# Patient Record
Sex: FEMALE | Race: WHITE | NOT HISPANIC OR LATINO | Employment: OTHER | URBAN - METROPOLITAN AREA
[De-identification: names, ages, dates, MRNs, and addresses within clinical notes are randomized per-mention and may not be internally consistent; named-entity substitution may affect disease eponyms.]

---

## 2024-03-01 ENCOUNTER — NURSING HOME VISIT (OUTPATIENT)
Dept: POST ACUTE CARE | Facility: EXTERNAL LOCATION | Age: 89
End: 2024-03-01
Payer: MEDICARE

## 2024-03-01 VITALS
BODY MASS INDEX: 21.34 KG/M2 | OXYGEN SATURATION: 97 % | HEART RATE: 61 BPM | WEIGHT: 125 LBS | TEMPERATURE: 97.3 F | HEIGHT: 64 IN | DIASTOLIC BLOOD PRESSURE: 64 MMHG | RESPIRATION RATE: 19 BRPM | SYSTOLIC BLOOD PRESSURE: 138 MMHG

## 2024-03-01 DIAGNOSIS — I50.89 OTHER HEART FAILURE (MULTI): ICD-10-CM

## 2024-03-01 DIAGNOSIS — E55.9 VITAMIN D DEFICIENCY: ICD-10-CM

## 2024-03-01 DIAGNOSIS — I48.0 PAROXYSMAL ATRIAL FIBRILLATION (MULTI): Primary | ICD-10-CM

## 2024-03-01 DIAGNOSIS — N39.41 URGE INCONTINENCE OF URINE: ICD-10-CM

## 2024-03-01 DIAGNOSIS — M15.9 PRIMARY OSTEOARTHRITIS INVOLVING MULTIPLE JOINTS: ICD-10-CM

## 2024-03-01 PROBLEM — K21.9 GASTROESOPHAGEAL REFLUX DISEASE WITHOUT ESOPHAGITIS: Status: ACTIVE | Noted: 2024-03-01

## 2024-03-01 PROBLEM — M19.90 DEGENERATIVE JOINT DISEASE: Status: ACTIVE | Noted: 2024-03-01

## 2024-03-01 PROBLEM — F01.50 VASCULAR DEMENTIA (MULTI): Status: ACTIVE | Noted: 2024-03-01

## 2024-03-01 PROCEDURE — 99344 HOME/RES VST NEW MOD MDM 60: CPT

## 2024-03-01 PROCEDURE — 99497 ADVNCD CARE PLAN 30 MIN: CPT

## 2024-03-01 RX ORDER — POTASSIUM CHLORIDE 750 MG/1
10 TABLET, FILM COATED, EXTENDED RELEASE ORAL DAILY
COMMUNITY

## 2024-03-01 RX ORDER — B-COMPLEX WITH VITAMIN C
1 TABLET ORAL DAILY
COMMUNITY

## 2024-03-01 RX ORDER — CALCIUM CARBONATE 200(500)MG
2 TABLET,CHEWABLE ORAL 3 TIMES DAILY PRN
COMMUNITY

## 2024-03-01 RX ORDER — CYANOCOBALAMIN (VITAMIN B-12) 500 MCG
2 TABLET ORAL DAILY
COMMUNITY

## 2024-03-01 RX ORDER — PANTOPRAZOLE SODIUM 40 MG/1
40 TABLET, DELAYED RELEASE ORAL
COMMUNITY

## 2024-03-01 RX ORDER — SERTRALINE HYDROCHLORIDE 50 MG/1
50 TABLET, FILM COATED ORAL DAILY
COMMUNITY

## 2024-03-01 RX ORDER — LOPERAMIDE HYDROCHLORIDE 2 MG/1
2 CAPSULE ORAL 3 TIMES DAILY PRN
COMMUNITY

## 2024-03-01 RX ORDER — ACETAMINOPHEN 325 MG/1
650 TABLET ORAL 3 TIMES DAILY PRN
COMMUNITY

## 2024-03-01 RX ORDER — ACETAMINOPHEN 500 MG
1000 TABLET ORAL DAILY
COMMUNITY

## 2024-03-01 RX ORDER — METOPROLOL TARTRATE 25 MG/1
25 TABLET, FILM COATED ORAL 2 TIMES DAILY
COMMUNITY

## 2024-03-01 RX ORDER — FUROSEMIDE 20 MG/1
20 TABLET ORAL DAILY
COMMUNITY

## 2024-03-01 ASSESSMENT — ENCOUNTER SYMPTOMS
ABDOMINAL DISTENTION: 0
DIFFICULTY URINATING: 0
DIZZINESS: 0
CHOKING: 0
CHEST TIGHTNESS: 0
APPETITE CHANGE: 0
CONSTIPATION: 0
ABDOMINAL PAIN: 0
HEADACHES: 0
HEMATURIA: 0
ARTHRALGIAS: 1
ACTIVITY CHANGE: 0
EYE DISCHARGE: 0
CHILLS: 0
VOMITING: 0
FATIGUE: 0
EYE PAIN: 0
SHORTNESS OF BREATH: 0
WEAKNESS: 0
RHINORRHEA: 1
DIARRHEA: 0
SLEEP DISTURBANCE: 0
SORE THROAT: 0
SEIZURES: 0
BRUISES/BLEEDS EASILY: 0
DYSURIA: 0
WHEEZING: 0
NAUSEA: 0
NERVOUS/ANXIOUS: 0
EYE ITCHING: 0
FEVER: 0
SPEECH DIFFICULTY: 0
COUGH: 0
PALPITATIONS: 0
LIGHT-HEADEDNESS: 0
FLANK PAIN: 0
CONFUSION: 1

## 2024-03-01 ASSESSMENT — PAIN SCALES - GENERAL: PAINLEVEL: 0-NO PAIN

## 2024-03-01 NOTE — ASSESSMENT & PLAN NOTE
History of vascular dementia. Mood is stable on sertraline. Staff to report any behavioral concerns to provider.

## 2024-03-01 NOTE — ASSESSMENT & PLAN NOTE
History of. Last echo in 2022 with EF 65%. Rate controlled.   Continue with metoprolol, furosemide, and potassium supplement. Facility to monitor VS and weights routinely. Staff to notify provider with any cardiovascular concerns.

## 2024-03-01 NOTE — PROGRESS NOTES
"Subjective   Patient ID: Guera Leon is a 88 y.o. female who presents for Follow-up (Afib, vascular dementia).    HPI   Patient is seen sitting in chair in her room, this visit is to establish care with the [facility] program. Patient's daughter Rae is present for visit. Explained House Calls program and expectations.  Rae assists with posing questions to her mom and providing much of the information, as the patient has dementia and is a poor historian.     PMH: paroxysmal atrial fibrillation, CHF, GERD, urge incontinence, degenerative joint disease, vascular dementia, hard of hearing.     Rae has no siblings and was providing care for her mother prior to moving her to the assisted living facility. States her mother lived in her own home by herself until November 2023. Previous PCP was Dr. Walter Madrigal at [facility].   Daughter reports that she feels her mother is happy here, there is no anxiety or abnormal behavior. The patient eats all meals in her room and does not join in any group activities. She is independent with ambulation, no walker/cane, no falls reported. The patient denies feeling unwell, no fevers, chills, headache, congestion. Denies nausea, daughter states that \"she eats everything\". The patient has urge incontinence but will go to the bathroom if reminded. No issues with bowel or bladder. She is sleeping well, at least 12-14 hours each night.       Current Outpatient Medications:     acetaminophen (Tylenol) 325 mg tablet, Take 2 tablets (650 mg) by mouth 3 times a day as needed for mild pain (1 - 3) (for pain)., Disp: , Rfl:     acetaminophen (Tylenol) 500 mg tablet, Take 2 tablets (1,000 mg) by mouth once daily., Disp: , Rfl:     B complex-vitamin C tablet, Take 1 tablet by mouth once daily., Disp: , Rfl:     calcium carbonate (Tums) 200 mg calcium chewable tablet, Chew 2 tablets (1,000 mg) 3 times a day as needed for indigestion or heartburn., Disp: , Rfl:     furosemide (Lasix) 20 mg tablet, " "Take 1 tablet (20 mg) by mouth once daily., Disp: , Rfl:     loperamide (Imodium) 2 mg capsule, Take 1 capsule (2 mg) by mouth 3 times a day as needed for diarrhea (for loose stools)., Disp: , Rfl:     metoprolol tartrate (Lopressor) 25 mg tablet, Take 1 tablet (25 mg) by mouth 2 times a day., Disp: , Rfl:     pantoprazole (ProtoNix) 40 mg EC tablet, Take 1 tablet (40 mg) by mouth once daily in the morning. Take before meals. Do not crush, chew, or split., Disp: , Rfl:     potassium chloride CR 10 mEq ER tablet, Take 1 tablet (10 mEq) by mouth once daily. Do not crush, chew, or split., Disp: , Rfl:     sertraline (Zoloft) 50 mg tablet, Take 1 tablet (50 mg) by mouth once daily. At night, Disp: , Rfl:      Review of Systems   Constitutional:  Negative for activity change, appetite change, chills, fatigue and fever.   HENT:  Positive for rhinorrhea. Negative for congestion, drooling and sore throat.    Eyes:  Negative for pain, discharge and itching.   Respiratory:  Negative for cough, choking, chest tightness, shortness of breath and wheezing.    Cardiovascular:  Negative for chest pain, palpitations and leg swelling.   Gastrointestinal:  Negative for abdominal distention, abdominal pain, constipation, diarrhea, nausea and vomiting.   Genitourinary:  Negative for difficulty urinating, dysuria, flank pain and hematuria.   Musculoskeletal:  Positive for arthralgias.   Neurological:  Negative for dizziness, seizures, syncope, speech difficulty, weakness, light-headedness and headaches.   Hematological:  Does not bruise/bleed easily.   Psychiatric/Behavioral:  Positive for confusion. Negative for behavioral problems and sleep disturbance. The patient is not nervous/anxious.        Objective   /64 (BP Location: Left arm, Patient Position: Sitting, BP Cuff Size: Adult)   Pulse 61   Temp 36.3 °C (97.3 °F) (Temporal)   Resp 19   Ht 1.626 m (5' 4\")   Wt 56.7 kg (125 lb)   SpO2 97% Comment: room air  BMI 21.46 " "kg/m²     Physical Exam  Constitutional:       General: She is not in acute distress.     Appearance: Normal appearance. She is normal weight. She is not ill-appearing.   HENT:      Head: Normocephalic.      Nose: No congestion or rhinorrhea.      Mouth/Throat:      Mouth: Mucous membranes are moist.   Eyes:      Pupils: Pupils are equal, round, and reactive to light.   Cardiovascular:      Rate and Rhythm: Normal rate and regular rhythm.      Pulses: Normal pulses.      Heart sounds: No murmur heard.  Pulmonary:      Effort: Pulmonary effort is normal. No respiratory distress.      Breath sounds: Normal breath sounds. No wheezing or rales.   Abdominal:      General: Bowel sounds are normal. There is no distension.      Palpations: Abdomen is soft.      Tenderness: There is no abdominal tenderness. There is no guarding.   Musculoskeletal:      Cervical back: Neck supple.      Right lower leg: No edema.      Left lower leg: No edema.   Skin:     General: Skin is warm and dry.      Capillary Refill: Capillary refill takes less than 2 seconds.      Findings: Lesion present. No rash.      Comments: Small, round open lesion noted on left ear, middle, outer edge of tragus   Neurological:      Mental Status: She is alert. Mental status is at baseline.   Psychiatric:         Mood and Affect: Mood normal.         Behavior: Behavior normal.      Comments: Daughter states patient seems quite happy, has not noticed any crying or abnormal behavior.          Assessment/Plan   Diagnoses and all orders for this visit:  Paroxysmal atrial fibrillation (CMS/HCC)  Vitamin D deficiency  Urge incontinence of urine  Primary osteoarthritis involving multiple joints  Other heart failure (CMS/HCC)    Goals of care discussion with daughter Rae. Rae is POA for the patient and states that she believes that \"if something should happen to my mom, it is her time to go\". Rae does not wish her mother to receive chest compressions or intubation. She " thinks that she has completed paperwork already, but it is not signed. Will follow up with the Director of Nursing.   All questions and concerns that Rae had were addressed. Patient is stable, continue with monthly House Calls NP visits.       Erika Matta, APRN-CNP

## 2024-03-01 NOTE — ASSESSMENT & PLAN NOTE
Chronic. Reports no pain this visit. Independent with ambulation, no recent falls. Continue scheduled acetaminophen for pain control. Staff to report uncontrolled pain levels to provider.

## 2024-03-01 NOTE — LETTER
"Patient: Guera Leon  : 1935    Encounter Date: 2024    Subjective  Patient ID: Guera Leon is a 88 y.o. female who presents for Follow-up (Afib, vascular dementia).    HPI   Patient is seen sitting in chair in her room, this visit is to establish care with the House Calls program. Patient's daughter Rae is present for visit. Explained House Calls program and expectations.  Rae assists with posing questions to her mom and providing much of the information, as the patient has dementia and is a poor historian.     PMH: paroxysmal atrial fibrillation, CHF, GERD, urge incontinence, degenerative joint disease, vascular dementia, hard of hearing.     Rae has no siblings and was providing care for her mother prior to moving her to the assisted living facility. States her mother lived in her own home by herself until 2023. Previous PCP was Dr. Walter Madrigal at [facility].   Daughter reports that she feels her mother is happy here, there is no anxiety or abnormal behavior. The patient eats all meals in her room and does not join in any group activities. She is independent with ambulation, no walker/cane, no falls reported. The patient denies feeling unwell, no fevers, chills, headache, congestion. Denies nausea, daughter states that \"she eats everything\". The patient has urge incontinence but will go to the bathroom if reminded. No issues with bowel or bladder. She is sleeping well, at least 12-14 hours each night.       Current Outpatient Medications:   •  acetaminophen (Tylenol) 325 mg tablet, Take 2 tablets (650 mg) by mouth 3 times a day as needed for mild pain (1 - 3) (for pain)., Disp: , Rfl:   •  acetaminophen (Tylenol) 500 mg tablet, Take 2 tablets (1,000 mg) by mouth once daily., Disp: , Rfl:   •  B complex-vitamin C tablet, Take 1 tablet by mouth once daily., Disp: , Rfl:   •  calcium carbonate (Tums) 200 mg calcium chewable tablet, Chew 2 tablets (1,000 mg) 3 times a day as needed " for indigestion or heartburn., Disp: , Rfl:   •  furosemide (Lasix) 20 mg tablet, Take 1 tablet (20 mg) by mouth once daily., Disp: , Rfl:   •  loperamide (Imodium) 2 mg capsule, Take 1 capsule (2 mg) by mouth 3 times a day as needed for diarrhea (for loose stools)., Disp: , Rfl:   •  metoprolol tartrate (Lopressor) 25 mg tablet, Take 1 tablet (25 mg) by mouth 2 times a day., Disp: , Rfl:   •  pantoprazole (ProtoNix) 40 mg EC tablet, Take 1 tablet (40 mg) by mouth once daily in the morning. Take before meals. Do not crush, chew, or split., Disp: , Rfl:   •  potassium chloride CR 10 mEq ER tablet, Take 1 tablet (10 mEq) by mouth once daily. Do not crush, chew, or split., Disp: , Rfl:   •  sertraline (Zoloft) 50 mg tablet, Take 1 tablet (50 mg) by mouth once daily. At night, Disp: , Rfl:      Review of Systems   Constitutional:  Negative for activity change, appetite change, chills, fatigue and fever.   HENT:  Positive for rhinorrhea. Negative for congestion, drooling and sore throat.    Eyes:  Negative for pain, discharge and itching.   Respiratory:  Negative for cough, choking, chest tightness, shortness of breath and wheezing.    Cardiovascular:  Negative for chest pain, palpitations and leg swelling.   Gastrointestinal:  Negative for abdominal distention, abdominal pain, constipation, diarrhea, nausea and vomiting.   Genitourinary:  Negative for difficulty urinating, dysuria, flank pain and hematuria.   Musculoskeletal:  Positive for arthralgias.   Neurological:  Negative for dizziness, seizures, syncope, speech difficulty, weakness, light-headedness and headaches.   Hematological:  Does not bruise/bleed easily.   Psychiatric/Behavioral:  Positive for confusion. Negative for behavioral problems and sleep disturbance. The patient is not nervous/anxious.        Objective  /64 (BP Location: Left arm, Patient Position: Sitting, BP Cuff Size: Adult)   Pulse 61   Temp 36.3 °C (97.3 °F) (Temporal)   Resp 19    "Ht 1.626 m (5' 4\")   Wt 56.7 kg (125 lb)   SpO2 97% Comment: room air  BMI 21.46 kg/m²     Physical Exam  Constitutional:       General: She is not in acute distress.     Appearance: Normal appearance. She is normal weight. She is not ill-appearing.   HENT:      Head: Normocephalic.      Nose: No congestion or rhinorrhea.      Mouth/Throat:      Mouth: Mucous membranes are moist.   Eyes:      Pupils: Pupils are equal, round, and reactive to light.   Cardiovascular:      Rate and Rhythm: Normal rate and regular rhythm.      Pulses: Normal pulses.      Heart sounds: No murmur heard.  Pulmonary:      Effort: Pulmonary effort is normal. No respiratory distress.      Breath sounds: Normal breath sounds. No wheezing or rales.   Abdominal:      General: Bowel sounds are normal. There is no distension.      Palpations: Abdomen is soft.      Tenderness: There is no abdominal tenderness. There is no guarding.   Musculoskeletal:      Cervical back: Neck supple.      Right lower leg: No edema.      Left lower leg: No edema.   Skin:     General: Skin is warm and dry.      Capillary Refill: Capillary refill takes less than 2 seconds.      Findings: Lesion present. No rash.      Comments: Small, round open lesion noted on left ear, middle, outer edge of tragus   Neurological:      Mental Status: She is alert. Mental status is at baseline.   Psychiatric:         Mood and Affect: Mood normal.         Behavior: Behavior normal.      Comments: Daughter states patient seems quite happy, has not noticed any crying or abnormal behavior.          Assessment/Plan  Diagnoses and all orders for this visit:  Paroxysmal atrial fibrillation (CMS/HCC)  Vitamin D deficiency  Urge incontinence of urine  Primary osteoarthritis involving multiple joints  Other heart failure (CMS/HCC)    Goals of care discussion with daughter Rae. Rae is POA for the patient and states that she believes that \"if something should happen to my mom, it is her time to " "go\". Rae does not wish her mother to receive chest compressions or intubation. She thinks that she has completed paperwork already, but it is not signed. Will follow up with the Director of Nursing.   All questions and concerns that Rae had were addressed. Patient is stable, continue with monthly House Calls NP visits.       EDITH Wagner      Electronically Signed By: EDITH Wagner   3/1/24  1:21 PM  "

## 2024-05-03 ENCOUNTER — NURSING HOME VISIT (OUTPATIENT)
Dept: POST ACUTE CARE | Facility: EXTERNAL LOCATION | Age: 89
End: 2024-05-03
Payer: MEDICARE

## 2024-05-03 VITALS
SYSTOLIC BLOOD PRESSURE: 140 MMHG | RESPIRATION RATE: 16 BRPM | TEMPERATURE: 97.5 F | HEART RATE: 60 BPM | DIASTOLIC BLOOD PRESSURE: 80 MMHG | OXYGEN SATURATION: 95 %

## 2024-05-03 DIAGNOSIS — K21.9 GASTROESOPHAGEAL REFLUX DISEASE WITHOUT ESOPHAGITIS: ICD-10-CM

## 2024-05-03 DIAGNOSIS — I48.0 PAROXYSMAL ATRIAL FIBRILLATION (MULTI): Primary | ICD-10-CM

## 2024-05-03 DIAGNOSIS — F01.50 VASCULAR DEMENTIA, UNSPECIFIED DEMENTIA SEVERITY, UNSPECIFIED WHETHER BEHAVIORAL, PSYCHOTIC, OR MOOD DISTURBANCE OR ANXIETY (MULTI): ICD-10-CM

## 2024-05-03 PROCEDURE — 99349 HOME/RES VST EST MOD MDM 40: CPT

## 2024-05-03 ASSESSMENT — PAIN SCALES - GENERAL: PAINLEVEL: 0-NO PAIN

## 2024-05-03 NOTE — Clinical Note
Patient: Guera Leon  : 1935    Encounter Date: 2024    No notes on file    Electronically Signed By: EDITH Wagner   24 12:28 PM

## 2024-05-10 ASSESSMENT — ENCOUNTER SYMPTOMS
HEADACHES: 0
EYE PAIN: 0
RHINORRHEA: 1
SPEECH DIFFICULTY: 0
SLEEP DISTURBANCE: 0
LIGHT-HEADEDNESS: 0
BRUISES/BLEEDS EASILY: 0
CHOKING: 0
SEIZURES: 0
ARTHRALGIAS: 1
ACTIVITY CHANGE: 0
SORE THROAT: 0
FREQUENCY: 0
FATIGUE: 0
FLANK PAIN: 0
DIAPHORESIS: 0
APPETITE CHANGE: 0
WOUND: 1
CHILLS: 0
HEMATURIA: 0
VOMITING: 0
DIFFICULTY URINATING: 0
FEVER: 0
AGITATION: 0
NAUSEA: 0
CONFUSION: 1
ABDOMINAL PAIN: 0
DIZZINESS: 0
NERVOUS/ANXIOUS: 0
DYSURIA: 0
PALPITATIONS: 0
UNEXPECTED WEIGHT CHANGE: 0
CONSTIPATION: 0
TREMORS: 0
COUGH: 0
DIARRHEA: 0
SHORTNESS OF BREATH: 0

## 2024-05-10 NOTE — PROGRESS NOTES
Subjective   Patient ID: Guera Leon is a 88 y.o. female who presents for Follow-up.    HPI   Patient is seen in her room in no acute distress. Nursing reports that she does not leave her room. HPI and ROS supplemented by nursing as patient is extremely hard of hearing with a history of dementia, and is a poor historian. The patient eats all meals in her room and does not join in any group activities. She is independent with ambulation, no walker/cane, no falls reported. The patient denies feeling unwell, no fevers, chills, headache, congestion. Denies nausea, nursing reports no issues with appetite. The patient has urge incontinence but will go to the bathroom if reminded. No issues with bowel or bladder. She is sleeping well, at least 12-14 hours each night.     PMH: paroxysmal atrial fibrillation, CHF, GERD, urge incontinence, degenerative joint disease, vascular dementia, hard of hearing.     Current Outpatient Medications:     acetaminophen (Tylenol) 325 mg tablet, Take 2 tablets (650 mg) by mouth 3 times a day as needed for mild pain (1 - 3) (for pain)., Disp: , Rfl:     acetaminophen (Tylenol) 500 mg tablet, Take 2 tablets (1,000 mg) by mouth once daily., Disp: , Rfl:     B complex-vitamin C tablet, Take 1 tablet by mouth once daily., Disp: , Rfl:     calcium carbonate (Tums) 200 mg calcium chewable tablet, Chew 2 tablets (1,000 mg) 3 times a day as needed for indigestion or heartburn., Disp: , Rfl:     cholecalciferol (Vitamin D-3) 10 MCG (400 UNIT) tablet, Take 2 tablets (20 mcg) by mouth once daily., Disp: , Rfl:     furosemide (Lasix) 20 mg tablet, Take 1 tablet (20 mg) by mouth once daily., Disp: , Rfl:     loperamide (Imodium) 2 mg capsule, Take 1 capsule (2 mg) by mouth 3 times a day as needed for diarrhea (for loose stools)., Disp: , Rfl:     metoprolol tartrate (Lopressor) 25 mg tablet, Take 1 tablet (25 mg) by mouth 2 times a day., Disp: , Rfl:     pantoprazole (ProtoNix) 40 mg EC tablet,  Take 1 tablet (40 mg) by mouth once daily in the morning. Take before meals. Do not crush, chew, or split., Disp: , Rfl:     potassium chloride CR 10 mEq ER tablet, Take 1 tablet (10 mEq) by mouth once daily. Do not crush, chew, or split., Disp: , Rfl:     sertraline (Zoloft) 50 mg tablet, Take 1 tablet (50 mg) by mouth once daily. At night, Disp: , Rfl:      Review of Systems   Constitutional:  Negative for activity change, appetite change, chills, diaphoresis, fatigue, fever and unexpected weight change.   HENT:  Positive for hearing loss and rhinorrhea. Negative for congestion, dental problem, ear pain and sore throat.    Eyes:  Negative for pain and visual disturbance.   Respiratory:  Negative for cough, choking and shortness of breath.    Cardiovascular:  Negative for chest pain, palpitations and leg swelling.   Gastrointestinal:  Negative for abdominal pain, constipation, diarrhea, nausea and vomiting.   Genitourinary:  Negative for difficulty urinating, dysuria, flank pain, frequency and hematuria.   Musculoskeletal:  Positive for arthralgias. Negative for gait problem.   Skin:  Positive for wound. Negative for rash.   Neurological:  Negative for dizziness, tremors, seizures, syncope, speech difficulty, light-headedness and headaches.   Hematological:  Does not bruise/bleed easily.   Psychiatric/Behavioral:  Positive for confusion. Negative for agitation, behavioral problems and sleep disturbance. The patient is not nervous/anxious.        Objective   /80 (BP Location: Right arm, Patient Position: Sitting, BP Cuff Size: Adult)   Pulse 60   Temp 36.4 °C (97.5 °F) (Temporal)   Resp 16   SpO2 95%     Physical Exam  Constitutional:       General: She is not in acute distress.     Appearance: Normal appearance. She is not ill-appearing.   HENT:      Head: Normocephalic.      Right Ear: External ear normal.      Left Ear: External ear normal.      Nose: Rhinorrhea present. No congestion.       Mouth/Throat:      Mouth: Mucous membranes are moist.   Eyes:      Conjunctiva/sclera: Conjunctivae normal.      Pupils: Pupils are equal, round, and reactive to light.   Cardiovascular:      Rate and Rhythm: Normal rate and regular rhythm.      Pulses: Normal pulses.      Heart sounds: No murmur heard.  Pulmonary:      Effort: Pulmonary effort is normal. No respiratory distress.      Breath sounds: No wheezing or rales.   Abdominal:      General: Bowel sounds are normal. There is no distension.      Palpations: Abdomen is soft.      Tenderness: There is no abdominal tenderness. There is no guarding.   Musculoskeletal:      Cervical back: Neck supple.      Right lower leg: No edema.      Left lower leg: No edema.   Skin:     General: Skin is warm and dry.      Capillary Refill: Capillary refill takes less than 2 seconds.      Findings: Lesion present. No rash.      Comments: Small, round open lesion noted on left ear, middle, outer edge of tragus    Neurological:      Mental Status: She is alert. Mental status is at baseline.      Comments: Oriented to self only.   Psychiatric:         Mood and Affect: Mood normal.         Behavior: Behavior normal.         Assessment/Plan   Paroxysmal atrial fibrillation (Multi)  History of. Last echo in 2022 with EF 65%. Rate controlled.   -continue with metoprolol, furosemide, and potassium supplement.   -facility to monitor VS and weights routinely. Staff to notify provider with any cardiovascular concerns.    Gastroesophageal reflux disease without esophagitis  Chronic, stable.  -continue PPI  Vascular dementia, unspecified dementia severity, unspecified whether behavioral, psychotic, or mood disturbance or anxiety (Multi)  History of vascular dementia. Mood is stable on sertraline.   -continue sertraline  -staff to report any behavioral concerns to provider.        Erika Matta, APRSUMMER-CNP

## 2024-06-01 ENCOUNTER — HOSPITAL ENCOUNTER (EMERGENCY)
Facility: HOSPITAL | Age: 89
Discharge: HOME | End: 2024-06-01
Attending: STUDENT IN AN ORGANIZED HEALTH CARE EDUCATION/TRAINING PROGRAM
Payer: MEDICARE

## 2024-06-01 ENCOUNTER — APPOINTMENT (OUTPATIENT)
Dept: RADIOLOGY | Facility: HOSPITAL | Age: 89
End: 2024-06-01
Payer: MEDICARE

## 2024-06-01 VITALS
DIASTOLIC BLOOD PRESSURE: 75 MMHG | RESPIRATION RATE: 20 BRPM | HEART RATE: 79 BPM | OXYGEN SATURATION: 98 % | BODY MASS INDEX: 21.34 KG/M2 | SYSTOLIC BLOOD PRESSURE: 139 MMHG | HEIGHT: 64 IN | TEMPERATURE: 99 F | WEIGHT: 125 LBS
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
## 2024-06-01 DIAGNOSIS — R11.0 NAUSEA: ICD-10-CM

## 2024-06-01 LAB
ALBUMIN SERPL BCP-MCNC: 4.4 G/DL (ref 3.4–5)
ALP SERPL-CCNC: 71 U/L (ref 33–136)
ALT SERPL W P-5'-P-CCNC: 12 U/L (ref 7–45)
ANION GAP SERPL CALC-SCNC: 15 MMOL/L (ref 10–20)
AST SERPL W P-5'-P-CCNC: 18 U/L (ref 9–39)
BASOPHILS # BLD AUTO: 0.05 X10*3/UL (ref 0–0.1)
BASOPHILS NFR BLD AUTO: 0.3 %
BILIRUB SERPL-MCNC: 1.4 MG/DL (ref 0–1.2)
BUN SERPL-MCNC: 20 MG/DL (ref 6–23)
CALCIUM SERPL-MCNC: 9.6 MG/DL (ref 8.6–10.3)
CHLORIDE SERPL-SCNC: 99 MMOL/L (ref 98–107)
CO2 SERPL-SCNC: 28 MMOL/L (ref 21–32)
CREAT SERPL-MCNC: 0.81 MG/DL (ref 0.5–1.05)
EGFRCR SERPLBLD CKD-EPI 2021: 70 ML/MIN/1.73M*2
EOSINOPHIL # BLD AUTO: 0 X10*3/UL (ref 0–0.4)
EOSINOPHIL NFR BLD AUTO: 0 %
ERYTHROCYTE [DISTWIDTH] IN BLOOD BY AUTOMATED COUNT: 12.5 % (ref 11.5–14.5)
GLUCOSE SERPL-MCNC: 128 MG/DL (ref 74–99)
HCT VFR BLD AUTO: 43.9 % (ref 36–46)
HGB BLD-MCNC: 14.3 G/DL (ref 12–16)
IMM GRANULOCYTES # BLD AUTO: 0.26 X10*3/UL (ref 0–0.5)
IMM GRANULOCYTES NFR BLD AUTO: 1.4 % (ref 0–0.9)
LIPASE SERPL-CCNC: 12 U/L (ref 9–82)
LYMPHOCYTES # BLD AUTO: 1.39 X10*3/UL (ref 0.8–3)
LYMPHOCYTES NFR BLD AUTO: 7.6 %
MCH RBC QN AUTO: 31.3 PG (ref 26–34)
MCHC RBC AUTO-ENTMCNC: 32.6 G/DL (ref 32–36)
MCV RBC AUTO: 96 FL (ref 80–100)
MONOCYTES # BLD AUTO: 1.97 X10*3/UL (ref 0.05–0.8)
MONOCYTES NFR BLD AUTO: 10.7 %
NEUTROPHILS # BLD AUTO: 14.7 X10*3/UL (ref 1.6–5.5)
NEUTROPHILS NFR BLD AUTO: 80 %
NRBC BLD-RTO: 0 /100 WBCS (ref 0–0)
PLATELET # BLD AUTO: 233 X10*3/UL (ref 150–450)
POTASSIUM SERPL-SCNC: 3.5 MMOL/L (ref 3.5–5.3)
PROT SERPL-MCNC: 7.7 G/DL (ref 6.4–8.2)
RBC # BLD AUTO: 4.57 X10*6/UL (ref 4–5.2)
SODIUM SERPL-SCNC: 138 MMOL/L (ref 136–145)
WBC # BLD AUTO: 18.4 X10*3/UL (ref 4.4–11.3)

## 2024-06-01 PROCEDURE — 76377 3D RENDER W/INTRP POSTPROCES: CPT | Performed by: RADIOLOGY

## 2024-06-01 PROCEDURE — 99285 EMERGENCY DEPT VISIT HI MDM: CPT | Mod: 25 | Performed by: STUDENT IN AN ORGANIZED HEALTH CARE EDUCATION/TRAINING PROGRAM

## 2024-06-01 PROCEDURE — 72125 CT NECK SPINE W/O DYE: CPT

## 2024-06-01 PROCEDURE — 83690 ASSAY OF LIPASE: CPT | Performed by: STUDENT IN AN ORGANIZED HEALTH CARE EDUCATION/TRAINING PROGRAM

## 2024-06-01 PROCEDURE — 70486 CT MAXILLOFACIAL W/O DYE: CPT

## 2024-06-01 PROCEDURE — 72125 CT NECK SPINE W/O DYE: CPT | Performed by: RADIOLOGY

## 2024-06-01 PROCEDURE — 80053 COMPREHEN METABOLIC PANEL: CPT | Performed by: STUDENT IN AN ORGANIZED HEALTH CARE EDUCATION/TRAINING PROGRAM

## 2024-06-01 PROCEDURE — 85025 COMPLETE CBC W/AUTO DIFF WBC: CPT | Performed by: STUDENT IN AN ORGANIZED HEALTH CARE EDUCATION/TRAINING PROGRAM

## 2024-06-01 PROCEDURE — 76377 3D RENDER W/INTRP POSTPROCES: CPT

## 2024-06-01 PROCEDURE — 36415 COLL VENOUS BLD VENIPUNCTURE: CPT | Performed by: STUDENT IN AN ORGANIZED HEALTH CARE EDUCATION/TRAINING PROGRAM

## 2024-06-01 PROCEDURE — 70486 CT MAXILLOFACIAL W/O DYE: CPT | Performed by: RADIOLOGY

## 2024-06-01 PROCEDURE — 74176 CT ABD & PELVIS W/O CONTRAST: CPT | Performed by: RADIOLOGY

## 2024-06-01 PROCEDURE — 70450 CT HEAD/BRAIN W/O DYE: CPT

## 2024-06-01 PROCEDURE — 74176 CT ABD & PELVIS W/O CONTRAST: CPT

## 2024-06-01 PROCEDURE — 70450 CT HEAD/BRAIN W/O DYE: CPT | Performed by: RADIOLOGY

## 2024-06-01 ASSESSMENT — PAIN SCALES - GENERAL: PAINLEVEL_OUTOF10: 3

## 2024-06-01 ASSESSMENT — LIFESTYLE VARIABLES
EVER FELT BAD OR GUILTY ABOUT YOUR DRINKING: NO
TOTAL SCORE: 0
HAVE YOU EVER FELT YOU SHOULD CUT DOWN ON YOUR DRINKING: NO
EVER HAD A DRINK FIRST THING IN THE MORNING TO STEADY YOUR NERVES TO GET RID OF A HANGOVER: NO
HAVE PEOPLE ANNOYED YOU BY CRITICIZING YOUR DRINKING: NO

## 2024-06-01 ASSESSMENT — PAIN DESCRIPTION - PAIN TYPE: TYPE: ACUTE PAIN

## 2024-06-01 ASSESSMENT — COLUMBIA-SUICIDE SEVERITY RATING SCALE - C-SSRS
1. IN THE PAST MONTH, HAVE YOU WISHED YOU WERE DEAD OR WISHED YOU COULD GO TO SLEEP AND NOT WAKE UP?: NO
2. HAVE YOU ACTUALLY HAD ANY THOUGHTS OF KILLING YOURSELF?: NO
6. HAVE YOU EVER DONE ANYTHING, STARTED TO DO ANYTHING, OR PREPARED TO DO ANYTHING TO END YOUR LIFE?: NO

## 2024-06-01 ASSESSMENT — PAIN - FUNCTIONAL ASSESSMENT: PAIN_FUNCTIONAL_ASSESSMENT: 0-10

## 2024-06-01 ASSESSMENT — PAIN DESCRIPTION - DESCRIPTORS: DESCRIPTORS: ACHING

## 2024-06-01 ASSESSMENT — PAIN DESCRIPTION - LOCATION: LOCATION: ABDOMEN

## 2024-06-01 NOTE — ED PROVIDER NOTES
HPI   Chief Complaint   Patient presents with    Fall       Patient is a 88-year-old female presenting from nursing facility for unwitnessed fall.  Patient was noted to be on the ground with ecchymosis to the nasal bridge with an abrasion to the right lower extremity.  History was given the unwitnessed fall by nursing facility.  No anticoagulation.  At normal baseline mental function.  Daughter was at bedside agrees that this is her normal mental status.  The patient currently denies any pain    Daughter does state that yesterday she had some nausea with dry heaving but subsequently this has resolved today.      History provided by:  Relative and nursing home  History limited by:  Dementia                      [place] Coma Scale Score: 15                     Patient History   History reviewed. No pertinent past medical history.  History reviewed. No pertinent surgical history.  No family history on file.  Social History     Tobacco Use    Smoking status: Never    Smokeless tobacco: Never   Substance Use Topics    Alcohol use: Not Currently    Drug use: Never       Physical Exam   ED Triage Vitals [06/01/24 0801]   Temperature Heart Rate Respirations BP   37.2 °C (99 °F) 87 18 148/83      Pulse Ox Temp Source Heart Rate Source Patient Position   94 % Temporal Monitor --      BP Location FiO2 (%)     -- --       Physical Exam  Constitutional:       General: She is not in acute distress.     Appearance: She is not toxic-appearing.   HENT:      Head:      Comments: Ecchymosis over the proximal portion of the nasal bridge.  There is a small abrasion noted to that area as well.  There is stable midface and dentition is intact.  The patient has no tenderness to palpation over the mandible.  No septal hematoma.  No significant lacerations or abrasions of the scalp.  Cardiovascular:      Rate and Rhythm: Normal rate and regular rhythm.   Pulmonary:      Breath sounds: Normal breath sounds.   Abdominal:      Palpations:  Abdomen is soft.      Tenderness: There is no abdominal tenderness.   Musculoskeletal:      Comments: No cervical spine tenderness, stable pelvis, no bony tenderness to the bilateral upper or lower extremities.   Skin:     Comments: Abrasion noted to the right lower extremity just distal to the knee on the anterior surface.  No significant laceration.   Neurological:      Mental Status: She is alert.         ED Course & MDM   Diagnoses as of 06/01/24 1659   Fall, initial encounter   Nausea       Medical Decision Making  Patient is an 88-year-old female presenting for fall.  Patient's fall was unwitnessed but does have signs of facial trauma.  Not on anticoagulation but did obtain a head CT and C-spine CT given the patient's age.  I also obtained a CT of the abdomen pelvis given the dry heaving yesterday.  All 3 were negative for acute traumatic findings.  The CT scan of the abdomen pelvis also was negative for obvious obstruction or volvulus or mass effect.  The patient remained asymptomatic here.  Laboratory values were reassuring and was discharged home.  Patient tolerated oral intake prior to discharge.        Procedure  Procedures     Duarte Reyes MD  06/01/24 8120

## 2024-06-01 NOTE — ED TRIAGE NOTES
"Patient sent from the memory care unit at [place] for an unwitnessed fall. Patient was found face down on the floor of her bathroom with a small hematoma to her chin. Patient is not on blood thinners. Staff also states she was \"not acting herselg\" yesterday and was dry heaving last night, upon arrival to the ED patient is c/o of some lower abdominal pain.   "

## 2024-06-07 ENCOUNTER — NURSING HOME VISIT (OUTPATIENT)
Dept: PRIMARY CARE | Facility: CLINIC | Age: 89
End: 2024-06-07
Payer: MEDICARE

## 2024-06-07 VITALS
TEMPERATURE: 98 F | SYSTOLIC BLOOD PRESSURE: 132 MMHG | HEART RATE: 72 BPM | OXYGEN SATURATION: 98 % | RESPIRATION RATE: 16 BRPM | DIASTOLIC BLOOD PRESSURE: 70 MMHG
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
## 2024-06-07 DIAGNOSIS — L03.116 CELLULITIS OF LEG, LEFT: ICD-10-CM

## 2024-06-07 DIAGNOSIS — R63.0 LACK OF APPETITE: ICD-10-CM

## 2024-06-07 PROCEDURE — 99349 HOME/RES VST EST MOD MDM 40: CPT

## 2024-06-07 ASSESSMENT — PAIN SCALES - GENERAL: PAINLEVEL: 6

## 2024-06-07 NOTE — PROGRESS NOTES
Subjective   Patient ID: Guera Leon is a 89 y.o. female who presents for Follow-up (Post acute for fall, nausea/vomiting).    HPI   Guera is seen in her room, lying in her bed for this visit. HPI and ROS supplemented by nursing as patient is extremely hard of hearing with a history of dementia, and is a poor historian.     PMH: paroxysmal atrial fibrillation, CHF, GERD, urge incontinence, degenerative joint disease, vascular dementia, hard of hearing.     Patient was seen in the ED on June 1 after being found on the floor in her bathroom by nursing staff. She had not been feeling well for a few days, not eating and experiencing nausea/vomiting.  CT head and c-spine were negative for acute process. Labs were unremarkable with the exception of leukocytosis. She had some bruising noted to her face and an abrasion to her RLE. She was discharged back to [facility] in stable condition.   Nursing staff report that Guera is still not eating well, and remains fatigued. Guera admits she does not feel well and that her leg is hurting. Nursing report that the LLE has become red and warm, with significant bruising and pain. There is no reported drainage or odor. It is reported that Guera is walking without difficulty. There has been no fever, respiratory distress, chest pain, or syncope.       Current Outpatient Medications:     acetaminophen (Tylenol) 325 mg tablet, Take 2 tablets (650 mg) by mouth 3 times a day as needed for mild pain (1 - 3) (for pain)., Disp: , Rfl:     B complex-vitamin C tablet, Take 1 tablet by mouth once daily., Disp: , Rfl:     calcium carbonate (Tums) 200 mg calcium chewable tablet, Chew 2 tablets (1,000 mg) 3 times a day as needed for indigestion or heartburn., Disp: , Rfl:     cholecalciferol (Vitamin D-3) 10 MCG (400 UNIT) tablet, Take 2 tablets (20 mcg) by mouth once daily., Disp: , Rfl:     furosemide (Lasix) 20 mg tablet, Take 1 tablet (20 mg) by mouth once daily., Disp: , Rfl:      loperamide (Imodium) 2 mg capsule, Take 1 capsule (2 mg) by mouth 3 times a day as needed for diarrhea (for loose stools)., Disp: , Rfl:     metoprolol tartrate (Lopressor) 25 mg tablet, Take 1 tablet (25 mg) by mouth 2 times a day., Disp: , Rfl:     pantoprazole (ProtoNix) 40 mg EC tablet, Take 1 tablet (40 mg) by mouth once daily in the morning. Take before meals. Do not crush, chew, or split., Disp: , Rfl:     potassium chloride CR 10 mEq ER tablet, Take 1 tablet (10 mEq) by mouth once daily. Do not crush, chew, or split., Disp: , Rfl:     sertraline (Zoloft) 50 mg tablet, Take 1 tablet (50 mg) by mouth once daily. At night, Disp: , Rfl:      Review of Systems   Constitutional:  Positive for activity change, appetite change and fatigue. Negative for chills, diaphoresis, fever and unexpected weight change.   HENT:  Negative for congestion, postnasal drip, rhinorrhea and sore throat.    Eyes:  Negative for visual disturbance.   Respiratory:  Negative for cough, choking, chest tightness, shortness of breath and wheezing.    Cardiovascular:  Positive for leg swelling. Negative for chest pain and palpitations.   Gastrointestinal:  Negative for abdominal pain, blood in stool, constipation, diarrhea, nausea and vomiting.   Genitourinary:  Negative for difficulty urinating, dysuria, flank pain and hematuria.   Musculoskeletal:  Negative for gait problem and myalgias.   Skin:  Positive for wound.        LLE with non pitting edema in tibial area, abrasion and ecchymosis (purple, red). Surrounding skin is edematous and red. No drainage, no odor.     RLE with small abrasion. No edema, warmth or drainage.    Neurological:  Negative for dizziness, syncope, speech difficulty, light-headedness and headaches.   Hematological:  Negative for adenopathy. Does not bruise/bleed easily.   Psychiatric/Behavioral:  Positive for confusion. Negative for agitation, behavioral problems and sleep disturbance. The patient is not  "nervous/anxious.        Objective   /70   Pulse 72   Temp 36.7 °C (98 °F)   Resp 16   LMP  (LMP Unknown)   SpO2 98% Comment: room air  No results found for: \"HGBA1C\"   Lab Results   Component Value Date    WBC 18.4 (H) 06/01/2024    HGB 14.3 06/01/2024    HCT 43.9 06/01/2024    MCV 96 06/01/2024     06/01/2024      Lab Results   Component Value Date    GLUCOSE 128 (H) 06/01/2024    CALCIUM 9.6 06/01/2024     06/01/2024    K 3.5 06/01/2024    CO2 28 06/01/2024    CL 99 06/01/2024    BUN 20 06/01/2024    CREATININE 0.81 06/01/2024      Lab Results   Component Value Date    INR 1.0 10/01/2021    PROTIME 11.4 10/01/2021          Physical Exam  Constitutional:       General: She is not in acute distress.     Appearance: She is normal weight. She is not toxic-appearing.   HENT:      Head: Normocephalic.      Right Ear: External ear normal.      Left Ear: External ear normal.      Nose: No congestion or rhinorrhea.      Mouth/Throat:      Mouth: Mucous membranes are moist.   Eyes:      General: No scleral icterus.     Conjunctiva/sclera: Conjunctivae normal.      Pupils: Pupils are equal, round, and reactive to light.   Cardiovascular:      Rate and Rhythm: Normal rate and regular rhythm.      Pulses: Normal pulses.      Heart sounds: Normal heart sounds.   Pulmonary:      Effort: Pulmonary effort is normal. No respiratory distress.      Breath sounds: No wheezing or rales.   Abdominal:      General: Bowel sounds are normal. There is no distension.      Palpations: Abdomen is soft.      Tenderness: There is no abdominal tenderness. There is no guarding.   Musculoskeletal:      Right lower leg: No edema.      Left lower leg: Edema present.      Comments: RLE with non pitting edema in tibial area, abrasion and ecchymosis (purple, red). small hematoma at distal end of tibia. Surrounding skin is edematous and red. No drainage, no odor.     LLE with small abrasion. No edema, warmth or drainage.  "   Skin:     General: Skin is warm and dry.      Capillary Refill: Capillary refill takes less than 2 seconds.      Findings: Bruising and lesion present.      Comments: LLE with non pitting edema in tibial area, abrasion and ecchymosis (purple, red). small hematoma at distal end of tibia. Surrounding skin is edematous and red. No drainage, no odor.     RLE with small abrasion. No edema, warmth or drainage.        Neurological:      Mental Status: She is alert. Mental status is at baseline.      Comments: Oriented to self only.   Psychiatric:         Mood and Affect: Mood normal.         Behavior: Behavior normal.         Assessment/Plan   Diagnoses and all orders for this visit:  Injury due to fall, sequela  Lack of appetite  Cellulitis of leg, left  Found on the floor in her bathroom on June 1st resulting in ED visit, workup for injury negative, but significant leukocytosis. Nursing staff report that patient is still not eating and remains fatigued. Abrasion to LLE looks infected: red, warm, edematous, painful. Urine and blood cultures are pending. WBC counts normalized.   -Keflex 500 mg BID  -cleanse wound with normal saline, apply contact restore and wrap with kerlix, change daily.   -Nursing staff to notify provider of any concerns.            Erika Matta, CHANDRAKANT-CNP

## 2024-06-10 ENCOUNTER — TELEPHONE (OUTPATIENT)
Dept: POST ACUTE CARE | Facility: EXTERNAL LOCATION | Age: 89
End: 2024-06-10
Payer: MEDICARE
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
## 2024-06-10 PROBLEM — R63.0 LACK OF APPETITE: Status: ACTIVE | Noted: 2024-06-10

## 2024-06-10 ASSESSMENT — ENCOUNTER SYMPTOMS
BLOOD IN STOOL: 0
APPETITE CHANGE: 1
NAUSEA: 0
ACTIVITY CHANGE: 1
DYSURIA: 0
ADENOPATHY: 0
WOUND: 1
FATIGUE: 1
UNEXPECTED WEIGHT CHANGE: 0
FEVER: 0
BRUISES/BLEEDS EASILY: 0
AGITATION: 0
CONSTIPATION: 0
SHORTNESS OF BREATH: 0
SPEECH DIFFICULTY: 0
WHEEZING: 0
DIAPHORESIS: 0
HEADACHES: 0
MYALGIAS: 0
DIFFICULTY URINATING: 0
LIGHT-HEADEDNESS: 0
NERVOUS/ANXIOUS: 0
PALPITATIONS: 0
ABDOMINAL PAIN: 0
CHOKING: 0
FLANK PAIN: 0
HEMATURIA: 0
COUGH: 0
CHEST TIGHTNESS: 0
SLEEP DISTURBANCE: 0
CONFUSION: 1
VOMITING: 0
SORE THROAT: 0
DIARRHEA: 0
CHILLS: 0
DIZZINESS: 0
RHINORRHEA: 0

## 2024-06-11 NOTE — TELEPHONE ENCOUNTER
Notified of urine culture results and sensitivity. Discontinued keflex and initiated Augmenin 875/125 orally every 12 hrs for 5 days.

## 2024-06-27 ENCOUNTER — TELEPHONE (OUTPATIENT)
Dept: PRIMARY CARE | Facility: CLINIC | Age: 89
End: 2024-06-27
Payer: MEDICARE

## 2024-06-27 NOTE — PROGRESS NOTES
Notified by nursing staff that LLE is still warm with erythema and pain after completion of antibiotic. VSS. Reviewed allergies, medications and recent labs. Ordered clindamycin 450 mg by mouth every 8 hrs for 7 days.     Erika Matta, CNP

## 2024-06-30 NOTE — TELEPHONE ENCOUNTER
6/28, 06:49 am  Spoke with nursing at [facility] r/t change in antibiotic therapy. Order given to discontinue clindamycin and initiate bactrim and amoxicillin. Will continue to monitor closely.     Erika Matta, CNP

## 2024-07-02 ENCOUNTER — NURSING HOME VISIT (OUTPATIENT)
Dept: POST ACUTE CARE | Facility: EXTERNAL LOCATION | Age: 89
End: 2024-07-02
Payer: MEDICARE

## 2024-07-02 VITALS
HEART RATE: 65 BPM | SYSTOLIC BLOOD PRESSURE: 117 MMHG | TEMPERATURE: 97.8 F | RESPIRATION RATE: 17 BRPM | OXYGEN SATURATION: 98 % | DIASTOLIC BLOOD PRESSURE: 58 MMHG

## 2024-07-02 DIAGNOSIS — L03.116 CELLULITIS OF LEFT LOWER EXTREMITY: Primary | ICD-10-CM

## 2024-07-02 DIAGNOSIS — R63.0 LACK OF APPETITE: ICD-10-CM

## 2024-07-02 ASSESSMENT — PAIN SCALES - GENERAL: PAINLEVEL: 0-NO PAIN

## 2024-07-02 NOTE — LETTER
Patient: Guera Leon  : 1935    Encounter Date: 2024    Subjective  Patient ID: Guera Leon is a 89 y.o. female who is a long term resident who resides in the Memory Care Unit at [facility]. This visit is a follow up for her recent leg wound and lack of appetite.     HPI   Guera is seen in her room, lying in her bed for this visit. HPI and ROS supplemented by nursing as patient is extremely hard of hearing with a history of dementia, and is a poor historian.      PMH: paroxysmal atrial fibrillation, CHF, GERD, urge incontinence, degenerative joint disease, vascular dementia, hard of hearing.      Patient was seen in the ED on  after being found on the floor in her bathroom by nursing staff. She had not been feeling well for a few days, not eating and experiencing nausea/vomiting.  CT head and c-spine were negative for acute process. Labs were unremarkable with the exception of leukocytosis. She had some bruising noted to her face and an abrasion to her RLE. She was discharged back to [facility] in stable condition.   Nursing staff report that Guera is eating a little better since starting the Remeron. Guera states her leg is feeling better. It is reported that Guera is walking without difficulty, and there is no drainage or odor from the leg. There has been no fever, respiratory distress, chest pain, or syncope.   Review of Systems  Constitutional:  Positive for activity change, appetite change and fatigue. Negative for chills, diaphoresis, fever and unexpected weight change.   HENT:  Negative for congestion, postnasal drip, rhinorrhea and sore throat.    Eyes:  Negative for visual disturbance.   Respiratory:  Negative for cough, choking, chest tightness, shortness of breath and wheezing.    Cardiovascular:  Negative for leg swelling, chest pain and palpitations.   Gastrointestinal:  Negative for abdominal pain, blood in stool,  constipation, diarrhea, nausea and vomiting.   Genitourinary:  Negative for difficulty urinating, dysuria, flank pain and hematuria.   Musculoskeletal:  Negative for gait problem and myalgias.   Skin:  Positive for wound.   Neurological:  Negative for dizziness, syncope, speech difficulty, light-headedness and headaches.   Hematological:  Negative for adenopathy. Does not bruise/bleed easily.   Psychiatric/Behavioral:  Positive for confusion. Negative for agitation, behavioral problems and sleep disturbance. The patient is not nervous/anxious.    Objective  /58   Pulse 65   Temp 36.6 °C (97.8 °F)   Resp 17   LMP  (LMP Unknown)   SpO2 98% Comment: room air    Lab Results   Component Value Date    WBC 18.4 (H) 06/01/2024    HGB 14.3 06/01/2024    HCT 43.9 06/01/2024    MCV 96 06/01/2024     06/01/2024      Lab Results   Component Value Date    GLUCOSE 128 (H) 06/01/2024    CALCIUM 9.6 06/01/2024     06/01/2024    K 3.5 06/01/2024    CO2 28 06/01/2024    CL 99 06/01/2024    BUN 20 06/01/2024    CREATININE 0.81 06/01/2024       Physical Exam  Constitutional:       General: She is not in acute distress.     Appearance: She is normal weight. She is not toxic-appearing.   HENT:      Head: Normocephalic.      Right Ear: External ear normal.      Left Ear: External ear normal.      Nose: No congestion or rhinorrhea.      Mouth/Throat:      Mouth: Mucous membranes are moist.   Eyes:      General: No scleral icterus.     Conjunctiva/sclera: Conjunctivae normal.      Pupils: Pupils are equal, round, and reactive to light.   Cardiovascular:      Rate and Rhythm: Normal rate and regular rhythm.      Pulses: Normal pulses.      Heart sounds: Normal heart sounds.   Pulmonary:      Effort: Pulmonary effort is normal. No respiratory distress.      Breath sounds: No wheezing or rales.   Abdominal:      General: Bowel sounds are normal. There is no distension.      Palpations: Abdomen is soft.      Tenderness:  There is no abdominal tenderness. There is no guarding.   Musculoskeletal:      Right lower leg: No edema.      Left lower leg: No edema present.      Comments: LLE with abrasion and ecchymosis, improved since last visit. No drainage, no odor.   RLE abrasion is healed  Skin:     General: Skin is warm and dry.      Capillary Refill: Capillary refill takes less than 2 seconds.      Findings: Bruising and lesion present.      Comments: LLE with abrasion and ecchymosis, improved since last visit. No drainage, no odor.    Neurological:      Mental Status: She is alert. Mental status is at baseline.      Comments: Oriented to self only.   Psychiatric:         Mood and Affect: Mood normal.         Behavior: Behavior normal.   Assessment/Plan  Diagnoses and all orders for this visit:  Cellulitis of left lower extremity  Lack of appetite  Found on the floor in her bathroom on June 1st resulting in ED visit, workup for injury negative, but significant leukocytosis. Nursing staff report that patient is eating a little better since beginning Remeron. Abrasion to LLE is improved since last visit,   -complete course of bactrim and amoxicillin   -continue Remeron 7.5 mg   -cleanse wound with normal saline, apply contact restore and wrap with kerlix, change daily.   -Nursing staff to notify provider of any concerns.            EDITH Wagner      Electronically Signed By: EDITH Wagner   7/5/24 11:27 PM

## 2024-07-02 NOTE — PROGRESS NOTES
Subjective   Patient ID: Guera Leon is a 89 y.o. female who is a long term resident who resides in the Memory Care Unit at [facility]. This visit is a follow up for her recent leg wound and lack of appetite.     HPI   Guera is seen in her room, lying in her bed for this visit. HPI and ROS supplemented by nursing as patient is extremely hard of hearing with a history of dementia, and is a poor historian.      PMH: paroxysmal atrial fibrillation, CHF, GERD, urge incontinence, degenerative joint disease, vascular dementia, hard of hearing.      Patient was seen in the ED on June 1 after being found on the floor in her bathroom by nursing staff. She had not been feeling well for a few days, not eating and experiencing nausea/vomiting.  CT head and c-spine were negative for acute process. Labs were unremarkable with the exception of leukocytosis. She had some bruising noted to her face and an abrasion to her RLE. She was discharged back to [facility] in stable condition.   Nursing staff report that Guera is eating a little better since starting the Remeron. Guera states her leg is feeling better. It is reported that Guera is walking without difficulty, and there is no drainage or odor from the leg. There has been no fever, respiratory distress, chest pain, or syncope.   Review of Systems  Constitutional:  Positive for activity change, appetite change and fatigue. Negative for chills, diaphoresis, fever and unexpected weight change.   HENT:  Negative for congestion, postnasal drip, rhinorrhea and sore throat.    Eyes:  Negative for visual disturbance.   Respiratory:  Negative for cough, choking, chest tightness, shortness of breath and wheezing.    Cardiovascular:  Negative for leg swelling, chest pain and palpitations.   Gastrointestinal:  Negative for abdominal pain, blood in stool, constipation, diarrhea, nausea and vomiting.   Genitourinary:  Negative for difficulty  urinating, dysuria, flank pain and hematuria.   Musculoskeletal:  Negative for gait problem and myalgias.   Skin:  Positive for wound.   Neurological:  Negative for dizziness, syncope, speech difficulty, light-headedness and headaches.   Hematological:  Negative for adenopathy. Does not bruise/bleed easily.   Psychiatric/Behavioral:  Positive for confusion. Negative for agitation, behavioral problems and sleep disturbance. The patient is not nervous/anxious.    Objective   /58   Pulse 65   Temp 36.6 °C (97.8 °F)   Resp 17   LMP  (LMP Unknown)   SpO2 98% Comment: room air    Lab Results   Component Value Date    WBC 18.4 (H) 06/01/2024    HGB 14.3 06/01/2024    HCT 43.9 06/01/2024    MCV 96 06/01/2024     06/01/2024      Lab Results   Component Value Date    GLUCOSE 128 (H) 06/01/2024    CALCIUM 9.6 06/01/2024     06/01/2024    K 3.5 06/01/2024    CO2 28 06/01/2024    CL 99 06/01/2024    BUN 20 06/01/2024    CREATININE 0.81 06/01/2024       Physical Exam  Constitutional:       General: She is not in acute distress.     Appearance: She is normal weight. She is not toxic-appearing.   HENT:      Head: Normocephalic.      Right Ear: External ear normal.      Left Ear: External ear normal.      Nose: No congestion or rhinorrhea.      Mouth/Throat:      Mouth: Mucous membranes are moist.   Eyes:      General: No scleral icterus.     Conjunctiva/sclera: Conjunctivae normal.      Pupils: Pupils are equal, round, and reactive to light.   Cardiovascular:      Rate and Rhythm: Normal rate and regular rhythm.      Pulses: Normal pulses.      Heart sounds: Normal heart sounds.   Pulmonary:      Effort: Pulmonary effort is normal. No respiratory distress.      Breath sounds: No wheezing or rales.   Abdominal:      General: Bowel sounds are normal. There is no distension.      Palpations: Abdomen is soft.      Tenderness: There is no abdominal tenderness. There is no guarding.   Musculoskeletal:      Right  lower leg: No edema.      Left lower leg: No edema present.      Comments: LLE with abrasion and ecchymosis, improved since last visit. No drainage, no odor.   RLE abrasion is healed  Skin:     General: Skin is warm and dry.      Capillary Refill: Capillary refill takes less than 2 seconds.      Findings: Bruising and lesion present.      Comments: LLE with abrasion and ecchymosis, improved since last visit. No drainage, no odor.    Neurological:      Mental Status: She is alert. Mental status is at baseline.      Comments: Oriented to self only.   Psychiatric:         Mood and Affect: Mood normal.         Behavior: Behavior normal.   Assessment/Plan   Diagnoses and all orders for this visit:  Cellulitis of left lower extremity  Lack of appetite  Found on the floor in her bathroom on June 1st resulting in ED visit, workup for injury negative, but significant leukocytosis. Nursing staff report that patient is eating a little better since beginning Remeron. Abrasion to LLE is improved since last visit,   -complete course of bactrim and amoxicillin   -continue Remeron 7.5 mg   -cleanse wound with normal saline, apply contact restore and wrap with kerlix, change daily.   -Nursing staff to notify provider of any concerns.            Erika Matta, APRN-CNP

## 2024-07-05 PROBLEM — L03.116 CELLULITIS OF LEFT LOWER EXTREMITY: Status: ACTIVE | Noted: 2024-07-05

## 2024-08-09 ENCOUNTER — NURSING HOME VISIT (OUTPATIENT)
Dept: POST ACUTE CARE | Facility: EXTERNAL LOCATION | Age: 89
End: 2024-08-09
Payer: MEDICARE

## 2024-08-09 VITALS
OXYGEN SATURATION: 97 % | DIASTOLIC BLOOD PRESSURE: 82 MMHG | SYSTOLIC BLOOD PRESSURE: 158 MMHG | HEART RATE: 80 BPM | TEMPERATURE: 97.8 F | RESPIRATION RATE: 16 BRPM

## 2024-08-09 DIAGNOSIS — I48.0 PAROXYSMAL ATRIAL FIBRILLATION (MULTI): ICD-10-CM

## 2024-08-09 DIAGNOSIS — F01.50 VASCULAR DEMENTIA, UNSPECIFIED DEMENTIA SEVERITY, UNSPECIFIED WHETHER BEHAVIORAL, PSYCHOTIC, OR MOOD DISTURBANCE OR ANXIETY (MULTI): Primary | ICD-10-CM

## 2024-08-09 DIAGNOSIS — I50.89 OTHER HEART FAILURE (MULTI): ICD-10-CM

## 2024-08-09 PROCEDURE — 99349 HOME/RES VST EST MOD MDM 40: CPT

## 2024-08-09 ASSESSMENT — PAIN SCALES - GENERAL: PAINLEVEL: 0-NO PAIN

## 2024-08-09 NOTE — LETTER
Patient: Guera Leon  : 1935    Encounter Date: 2024    Subjective  Patient ID: Guera Leon is a 89 y.o. female who presents for routine Follow-up of chronic medical conditions. She is a long term resident in the Memory Care Unit at [facility].    HPI   Guera is seen in her room, sitting on her bed for this visit. HPI and ROS supplemented by nursing as patient is extremely hard of hearing with a history of dementia, and is a poor historian. Nursing reports that Guera is doing well. She has come out of her room on occasion, and is eating well. She is no longer picking at her wounds on her legs, or her ear. She completed all antibiotics for BLE cellulitis. There are no concerns for bowel or bladder issues. Guera did wake up with a black eye during the last week in July, it is unknown how this happened. Ice was applied and neuro checks were negative for acute change. No reported falls.      PMH: paroxysmal atrial fibrillation, CHF, GERD, urge incontinence, degenerative joint disease, vascular dementia, hard of hearing.        Current Outpatient Medications:   •  acetaminophen (Tylenol) 325 mg tablet, Take 2 tablets (650 mg) by mouth 3 times a day as needed for mild pain (1 - 3) (for pain)., Disp: , Rfl:   •  B complex-vitamin C tablet, Take 1 tablet by mouth once daily., Disp: , Rfl:   •  calcium carbonate (Tums) 200 mg calcium chewable tablet, Chew 2 tablets (1,000 mg) 3 times a day as needed for indigestion or heartburn., Disp: , Rfl:   •  cholecalciferol (Vitamin D-3) 10 MCG (400 UNIT) tablet, Take 2 tablets (20 mcg) by mouth once daily., Disp: , Rfl:   •  furosemide (Lasix) 20 mg tablet, Take 1 tablet (20 mg) by mouth once daily., Disp: , Rfl:   •  loperamide (Imodium) 2 mg capsule, Take 1 capsule (2 mg) by mouth 3 times a day as needed for diarrhea (for loose stools)., Disp: , Rfl:   •  metoprolol tartrate (Lopressor) 25 mg tablet, Take 1 tablet (25 mg)  by mouth 2 times a day., Disp: , Rfl:   •  mirtazapine (Remeron) 7.5 mg tablet, Take 1 tablet (7.5 mg) by mouth once daily at bedtime., Disp: , Rfl:   •  pantoprazole (ProtoNix) 40 mg EC tablet, Take 1 tablet (40 mg) by mouth once daily in the morning. Take before meals. Do not crush, chew, or split., Disp: , Rfl:   •  potassium chloride CR 10 mEq ER tablet, Take 1 tablet (10 mEq) by mouth once daily. Do not crush, chew, or split., Disp: , Rfl:   •  sertraline (Zoloft) 50 mg tablet, Take 1 tablet (50 mg) by mouth once daily. At night, Disp: , Rfl:      Review of Systems  Constitutional:  Negative for activity change, appetite change and fatigue. Negative for chills, diaphoresis, fever and unexpected weight change.   HENT:  Negative for congestion, postnasal drip, rhinorrhea and sore throat.    Eyes:  Negative for visual disturbance.   Respiratory:  Negative for cough, choking, chest tightness, shortness of breath and wheezing.    Cardiovascular:  Negative for leg swelling, chest pain and palpitations.   Gastrointestinal:  Negative for abdominal pain, blood in stool, constipation, diarrhea, nausea and vomiting.   Genitourinary:  Negative for difficulty urinating, dysuria, flank pain and hematuria.   Musculoskeletal:  Negative for gait problem and myalgias.   Skin:  Negative for wound.   Neurological:  Negative for dizziness, syncope, speech difficulty, light-headedness and headaches.   Hematological:  Negative for adenopathy. Does not bruise/bleed easily.   Psychiatric/Behavioral:  Positive for confusion. Negative for agitation, behavioral problems and sleep disturbance. The patient is not nervous/anxious.    Objective  /82   Pulse 80   Temp 36.6 °C (97.8 °F)   Resp 16   LMP  (LMP Unknown)   SpO2 97% Comment: room air    Lab Results   Component Value Date    WBC 18.4 (H) 06/01/2024    HGB 14.3 06/01/2024    HCT 43.9 06/01/2024    MCV 96 06/01/2024     06/01/2024      Lab Results   Component Value  Date    GLUCOSE 128 (H) 06/01/2024    CALCIUM 9.6 06/01/2024     06/01/2024    K 3.5 06/01/2024    CO2 28 06/01/2024    CL 99 06/01/2024    BUN 20 06/01/2024    CREATININE 0.81 06/01/2024      Physical Exam  Constitutional:       General: She is not in acute distress.     Appearance: She is normal weight. She is not toxic-appearing.   HENT:      Head: Normocephalic.      Right Ear: External ear normal.      Left Ear: External ear normal.      Nose: No congestion or rhinorrhea.      Mouth/Throat:      Mouth: Mucous membranes are moist.   Eyes:      General: No scleral icterus.     Conjunctiva/sclera: left conjunctiva with erythema and clear drainage     Pupils: Pupils are equal, round, and reactive to light.     Cardiovascular:      Rate and Rhythm: Normal rate and regular rhythm.      Pulses: Normal pulses.      Heart sounds: Normal heart sounds.   Pulmonary:      Effort: Pulmonary effort is normal. No respiratory distress.      Breath sounds: No wheezing or rales.   Abdominal:      General: Bowel sounds are normal. There is no distension.      Palpations: Abdomen is soft.      Tenderness: There is no abdominal tenderness. There is no guarding.   Musculoskeletal:      Right lower leg: No edema.      Left lower leg: No edema present.      Comments: LLE with abrasion is scabbed over, surrounding area hyperpigmented.  RLE abrasion is scabbed over  Skin:     General: Skin is warm and dry.      Capillary Refill: Capillary refill takes less than 2 seconds.      Findings: Bruising and lesion present.      Comments: LLE with abrasion and ecchymosis, improved since last visit. No drainage, no odor.    Neurological:      Mental Status: She is alert. Mental status is at baseline.      Comments: Oriented to self only.   Psychiatric:         Mood and Affect: Mood normal.         Behavior: Behavior normal.   Assessment/Plan  Diagnoses and all orders for this visit:  Vascular dementia, unspecified dementia severity,  unspecified whether behavioral, psychotic, or mood disturbance or anxiety (Multi)  Chronic, mood has improved.   -continue b complex-vitamin C tablet, mirtazapine 7.5 mg, and sertraline 50 mg   -patient will remain on locked Memory Care Unit  -nursing staff to notify provider of any behavior or safety concerns  Paroxysmal atrial fibrillation (Multi)  Other heart failure (Multi)  Chronic, rate controlled, euvolemic.  -continue furosemide 20 mg, metoprolol 25 mg, and potassium 10 mEq    Patient is improved. Is eating well and even has come out of her room per nursing. Will continue with House Call NP visits. Nursing staff to notify provider of any concerns.          EDITH Wagner      Electronically Signed By: EDITH Wagner   8/14/24 10:36 PM

## 2024-08-09 NOTE — PROGRESS NOTES
Subjective   Patient ID: Guera Leon is a 89 y.o. female who presents for routine Follow-up of chronic medical conditions. She is a long term resident in the Memory Care Unit at [facility].    HPI   Guera is seen in her room, sitting on her bed for this visit. HPI and ROS supplemented by nursing as patient is extremely hard of hearing with a history of dementia, and is a poor historian. Nursing reports that Guera is doing well. She has come out of her room on occasion, and is eating well. She is no longer picking at her wounds on her legs, or her ear. She completed all antibiotics for BLE cellulitis. There are no concerns for bowel or bladder issues. Guera did wake up with a black eye during the last week in July, it is unknown how this happened. Ice was applied and neuro checks were negative for acute change. No reported falls.      PMH: paroxysmal atrial fibrillation, CHF, GERD, urge incontinence, degenerative joint disease, vascular dementia, hard of hearing.        Current Outpatient Medications:     acetaminophen (Tylenol) 325 mg tablet, Take 2 tablets (650 mg) by mouth 3 times a day as needed for mild pain (1 - 3) (for pain)., Disp: , Rfl:     B complex-vitamin C tablet, Take 1 tablet by mouth once daily., Disp: , Rfl:     calcium carbonate (Tums) 200 mg calcium chewable tablet, Chew 2 tablets (1,000 mg) 3 times a day as needed for indigestion or heartburn., Disp: , Rfl:     cholecalciferol (Vitamin D-3) 10 MCG (400 UNIT) tablet, Take 2 tablets (20 mcg) by mouth once daily., Disp: , Rfl:     furosemide (Lasix) 20 mg tablet, Take 1 tablet (20 mg) by mouth once daily., Disp: , Rfl:     loperamide (Imodium) 2 mg capsule, Take 1 capsule (2 mg) by mouth 3 times a day as needed for diarrhea (for loose stools)., Disp: , Rfl:     metoprolol tartrate (Lopressor) 25 mg tablet, Take 1 tablet (25 mg) by mouth 2 times a day., Disp: , Rfl:     mirtazapine (Remeron) 7.5 mg tablet,  Take 1 tablet (7.5 mg) by mouth once daily at bedtime., Disp: , Rfl:     pantoprazole (ProtoNix) 40 mg EC tablet, Take 1 tablet (40 mg) by mouth once daily in the morning. Take before meals. Do not crush, chew, or split., Disp: , Rfl:     potassium chloride CR 10 mEq ER tablet, Take 1 tablet (10 mEq) by mouth once daily. Do not crush, chew, or split., Disp: , Rfl:     sertraline (Zoloft) 50 mg tablet, Take 1 tablet (50 mg) by mouth once daily. At night, Disp: , Rfl:      Review of Systems  Constitutional:  Negative for activity change, appetite change and fatigue. Negative for chills, diaphoresis, fever and unexpected weight change.   HENT:  Negative for congestion, postnasal drip, rhinorrhea and sore throat.    Eyes:  Negative for visual disturbance.   Respiratory:  Negative for cough, choking, chest tightness, shortness of breath and wheezing.    Cardiovascular:  Negative for leg swelling, chest pain and palpitations.   Gastrointestinal:  Negative for abdominal pain, blood in stool, constipation, diarrhea, nausea and vomiting.   Genitourinary:  Negative for difficulty urinating, dysuria, flank pain and hematuria.   Musculoskeletal:  Negative for gait problem and myalgias.   Skin:  Negative for wound.   Neurological:  Negative for dizziness, syncope, speech difficulty, light-headedness and headaches.   Hematological:  Negative for adenopathy. Does not bruise/bleed easily.   Psychiatric/Behavioral:  Positive for confusion. Negative for agitation, behavioral problems and sleep disturbance. The patient is not nervous/anxious.    Objective   /82   Pulse 80   Temp 36.6 °C (97.8 °F)   Resp 16   LMP  (LMP Unknown)   SpO2 97% Comment: room air    Lab Results   Component Value Date    WBC 18.4 (H) 06/01/2024    HGB 14.3 06/01/2024    HCT 43.9 06/01/2024    MCV 96 06/01/2024     06/01/2024      Lab Results   Component Value Date    GLUCOSE 128 (H) 06/01/2024    CALCIUM 9.6 06/01/2024     06/01/2024     K 3.5 06/01/2024    CO2 28 06/01/2024    CL 99 06/01/2024    BUN 20 06/01/2024    CREATININE 0.81 06/01/2024      Physical Exam  Constitutional:       General: She is not in acute distress.     Appearance: She is normal weight. She is not toxic-appearing.   HENT:      Head: Normocephalic.      Right Ear: External ear normal.      Left Ear: External ear normal.      Nose: No congestion or rhinorrhea.      Mouth/Throat:      Mouth: Mucous membranes are moist.   Eyes:      General: No scleral icterus.     Conjunctiva/sclera: left conjunctiva with erythema and clear drainage     Pupils: Pupils are equal, round, and reactive to light.     Cardiovascular:      Rate and Rhythm: Normal rate and regular rhythm.      Pulses: Normal pulses.      Heart sounds: Normal heart sounds.   Pulmonary:      Effort: Pulmonary effort is normal. No respiratory distress.      Breath sounds: No wheezing or rales.   Abdominal:      General: Bowel sounds are normal. There is no distension.      Palpations: Abdomen is soft.      Tenderness: There is no abdominal tenderness. There is no guarding.   Musculoskeletal:      Right lower leg: No edema.      Left lower leg: No edema present.      Comments: LLE with abrasion is scabbed over, surrounding area hyperpigmented.  RLE abrasion is scabbed over  Skin:     General: Skin is warm and dry.      Capillary Refill: Capillary refill takes less than 2 seconds.      Findings: Bruising and lesion present.      Comments: LLE with abrasion and ecchymosis, improved since last visit. No drainage, no odor.    Neurological:      Mental Status: She is alert. Mental status is at baseline.      Comments: Oriented to self only.   Psychiatric:         Mood and Affect: Mood normal.         Behavior: Behavior normal.   Assessment/Plan   Diagnoses and all orders for this visit:  Vascular dementia, unspecified dementia severity, unspecified whether behavioral, psychotic, or mood disturbance or anxiety (Multi)  Chronic,  mood has improved.   -continue b complex-vitamin C tablet, mirtazapine 7.5 mg, and sertraline 50 mg   -patient will remain on locked Memory Care Unit  -nursing staff to notify provider of any behavior or safety concerns  Paroxysmal atrial fibrillation (Multi)  Other heart failure (Multi)  Chronic, rate controlled, euvolemic.  -continue furosemide 20 mg, metoprolol 25 mg, and potassium 10 mEq    Patient is improved. Is eating well and even has come out of her room per nursing. Will continue with House Call NP visits. Nursing staff to notify provider of any concerns.          Erika Matta, APRN-CNP

## 2024-08-14 PROBLEM — R63.0 LACK OF APPETITE: Status: RESOLVED | Noted: 2024-06-10 | Resolved: 2024-08-14

## 2024-08-14 RX ORDER — MIRTAZAPINE 7.5 MG/1
7.5 TABLET, FILM COATED ORAL NIGHTLY
COMMUNITY

## 2024-09-04 DIAGNOSIS — F01.B4 MODERATE VASCULAR DEMENTIA WITH ANXIETY (MULTI): Primary | ICD-10-CM

## 2024-09-04 DIAGNOSIS — L03.116 CELLULITIS OF LEFT LOWER EXTREMITY: ICD-10-CM

## 2024-09-04 RX ORDER — MEMANTINE HYDROCHLORIDE 5 MG/1
5 TABLET ORAL DAILY
Qty: 30 TABLET | Refills: 0 | Status: SHIPPED | OUTPATIENT
Start: 2024-09-04 | End: 2024-09-09

## 2024-09-04 RX ORDER — MUPIROCIN 20 MG/G
OINTMENT TOPICAL 3 TIMES DAILY
Qty: 22 G | Refills: 0 | Status: SHIPPED | OUTPATIENT
Start: 2024-09-04 | End: 2024-09-14

## 2024-09-06 ENCOUNTER — NURSING HOME VISIT (OUTPATIENT)
Dept: POST ACUTE CARE | Facility: EXTERNAL LOCATION | Age: 89
End: 2024-09-06
Payer: MEDICARE

## 2024-09-06 DIAGNOSIS — L03.116 CELLULITIS OF LEFT LOWER EXTREMITY: Primary | ICD-10-CM

## 2024-09-06 DIAGNOSIS — F01.54 VASCULAR DEMENTIA WITH ANXIETY, UNSPECIFIED DEMENTIA SEVERITY (MULTI): ICD-10-CM

## 2024-09-06 PROCEDURE — 99349 HOME/RES VST EST MOD MDM 40: CPT

## 2024-09-06 NOTE — LETTER
Patient: Guera Leon  : 1935    Encounter Date: 2024    Subjective  Patient ID: Guera Leon is a 89 y.o. female who presents for Follow-up (Acute: LLE wound and chronic dementia/anxiety). She is a long term resident in the Memory Care Unit at [facility].     HPI   Guera is seen in her private room, she is eating lunch. She is very [place] with a history of dementia. HPI and ROS are provided by facility staff and patient chart. Nursing reports that Guera has edema and erythema to her LLE with open wounds. They believe she is picking her skin again. Guera nods her head when asked if her LLE hurts her. Staff report that her appetite has increased slightly since increasing the mirtazapine. Staff report there is no fever, malaise, or other concerning behaviors.       Current Outpatient Medications:   •  acetaminophen (Tylenol) 325 mg tablet, Take 2 tablets (650 mg) by mouth 3 times a day as needed for mild pain (1 - 3) (for pain)., Disp: , Rfl:   •  B complex-vitamin C tablet, Take 1 tablet by mouth once daily., Disp: , Rfl:   •  calcium carbonate (Tums) 200 mg calcium chewable tablet, Chew 2 tablets (1,000 mg) 3 times a day as needed for indigestion or heartburn., Disp: , Rfl:   •  cholecalciferol (Vitamin D-3) 10 MCG (400 UNIT) tablet, Take 2 tablets (20 mcg) by mouth once daily., Disp: , Rfl:   •  furosemide (Lasix) 20 mg tablet, Take 1 tablet (20 mg) by mouth once daily., Disp: , Rfl:   •  loperamide (Imodium) 2 mg capsule, Take 1 capsule (2 mg) by mouth 3 times a day as needed for diarrhea (for loose stools)., Disp: , Rfl:   •  metoprolol tartrate (Lopressor) 25 mg tablet, Take 1 tablet (25 mg) by mouth 2 times a day., Disp: , Rfl:   •  mirtazapine (Remeron) 7.5 mg tablet, Take 1 tablet (7.5 mg) by mouth once daily at bedtime., Disp: , Rfl:   •  mupirocin (Bactroban) 2 % ointment, Apply topically 3 times a day for 10 days. apply to affected area, Disp: 22 g,  Rfl: 0  •  pantoprazole (ProtoNix) 40 mg EC tablet, Take 1 tablet (40 mg) by mouth once daily in the morning. Take before meals. Do not crush, chew, or split., Disp: , Rfl:   •  potassium chloride CR 10 mEq ER tablet, Take 1 tablet (10 mEq) by mouth once daily. Do not crush, chew, or split., Disp: , Rfl:   •  sertraline (Zoloft) 50 mg tablet, Take 1 tablet (50 mg) by mouth once daily. At night, Disp: , Rfl:   •  memantine (Namenda) 5 mg tablet, 1 TABLET BY MOUTH ONE TIME A DAY, Disp: 30 tablet, Rfl: 11     Review of Systems   Unable to perform ROS: Dementia (provided by facility staff)     Objective  /69   Pulse 78   Temp 36.3 °C (97.3 °F)   Resp 15   LMP  (LMP Unknown)   SpO2 98% Comment: room air    Lab Results   Component Value Date    WBC 18.4 (H) 06/01/2024    HGB 14.3 06/01/2024    HCT 43.9 06/01/2024    MCV 96 06/01/2024     06/01/2024      Lab Results   Component Value Date    GLUCOSE 128 (H) 06/01/2024    CALCIUM 9.6 06/01/2024     06/01/2024    K 3.5 06/01/2024    CO2 28 06/01/2024    CL 99 06/01/2024    BUN 20 06/01/2024    CREATININE 0.81 06/01/2024      Physical Exam  Constitutional:       General: She is not in acute distress.     Appearance: She is normal weight. She is not toxic-appearing.   HENT:      Head: Normocephalic.      Right Ear: External ear normal.      Left Ear: External ear normal.      Nose: No congestion or rhinorrhea.      Mouth/Throat:      Mouth: Mucous membranes are moist.   Eyes:      General: No scleral icterus.     Conjunctiva/sclera: clear     Pupils: Pupils are equal, round, and reactive to light.      Cardiovascular:      Rate and Rhythm: Normal rate and regular rhythm.      Pulses: Normal pulses.      Heart sounds: Normal heart sounds.   Pulmonary:      Effort: Pulmonary effort is normal. No respiratory distress.      Breath sounds: No wheezing or rales.   Abdominal:      General: Bowel sounds are normal. There is no distension.      Palpations:  Abdomen is soft.      Tenderness: There is no abdominal tenderness. There is no guarding.   Musculoskeletal:      Right lower leg: No edema.      Left lower leg: nonpitting edema present.    Skin:     General: Skin is warm and dry.      Capillary Refill: Capillary refill takes less than 2 seconds.      Findings: Bruising and lesion present.      Comments: LLE with abrasion, erythema, edema. No drainage, no odor. Painful with palpation.   Neurological:      Mental Status: She is alert. Mental status is at baseline.      Comments: Oriented to self only.   Psychiatric:         Mood and Affect: Mood normal.         Behavior: Behavior normal.      Assessment/Plan  Diagnoses and all orders for this visit:  Cellulitis of left lower extremity  History of skin picking. LLE with edema, erythema and open lesions  -continue mupirocin ointment  -initiated keflex  -keep covered with clean, dry dressing when possible  Vascular dementia with anxiety, unspecified dementia severity (Multi)  Chronic, patient has been out of room on occasion. Staff feel that skin picking occurs when patient has anxiety or is bored.  -continue to encourage patient to eat in dining area and join in activities  -continue sertraline and mirtazapine (for appetite)  -initiated memantine         EDITH Wagner      Electronically Signed By: EDITH Wagner   9/11/24 11:48 PM

## 2024-09-06 NOTE — PROGRESS NOTES
Subjective   Patient ID: Guera Leon is a 89 y.o. female who presents for Follow-up (Acute: LLE wound and chronic dementia/anxiety). She is a long term resident in the Memory Care Unit at [facility].     HPI   Guera is seen in her private room, she is eating lunch. She is very [place] with a history of dementia. HPI and ROS are provided by facility staff and patient chart. Nursing reports that Guera has edema and erythema to her LLE with open wounds. They believe she is picking her skin again. Guera nods her head when asked if her LLE hurts her. Staff report that her appetite has increased slightly since increasing the mirtazapine. Staff report there is no fever, malaise, or other concerning behaviors.       Current Outpatient Medications:     acetaminophen (Tylenol) 325 mg tablet, Take 2 tablets (650 mg) by mouth 3 times a day as needed for mild pain (1 - 3) (for pain)., Disp: , Rfl:     B complex-vitamin C tablet, Take 1 tablet by mouth once daily., Disp: , Rfl:     calcium carbonate (Tums) 200 mg calcium chewable tablet, Chew 2 tablets (1,000 mg) 3 times a day as needed for indigestion or heartburn., Disp: , Rfl:     cholecalciferol (Vitamin D-3) 10 MCG (400 UNIT) tablet, Take 2 tablets (20 mcg) by mouth once daily., Disp: , Rfl:     furosemide (Lasix) 20 mg tablet, Take 1 tablet (20 mg) by mouth once daily., Disp: , Rfl:     loperamide (Imodium) 2 mg capsule, Take 1 capsule (2 mg) by mouth 3 times a day as needed for diarrhea (for loose stools)., Disp: , Rfl:     metoprolol tartrate (Lopressor) 25 mg tablet, Take 1 tablet (25 mg) by mouth 2 times a day., Disp: , Rfl:     mirtazapine (Remeron) 7.5 mg tablet, Take 1 tablet (7.5 mg) by mouth once daily at bedtime., Disp: , Rfl:     mupirocin (Bactroban) 2 % ointment, Apply topically 3 times a day for 10 days. apply to affected area, Disp: 22 g, Rfl: 0    pantoprazole (ProtoNix) 40 mg EC tablet, Take 1 tablet (40 mg) by mouth  once daily in the morning. Take before meals. Do not crush, chew, or split., Disp: , Rfl:     potassium chloride CR 10 mEq ER tablet, Take 1 tablet (10 mEq) by mouth once daily. Do not crush, chew, or split., Disp: , Rfl:     sertraline (Zoloft) 50 mg tablet, Take 1 tablet (50 mg) by mouth once daily. At night, Disp: , Rfl:     memantine (Namenda) 5 mg tablet, 1 TABLET BY MOUTH ONE TIME A DAY, Disp: 30 tablet, Rfl: 11     Review of Systems   Unable to perform ROS: Dementia (provided by facility staff)     Objective   /69   Pulse 78   Temp 36.3 °C (97.3 °F)   Resp 15   LMP  (LMP Unknown)   SpO2 98% Comment: room air    Lab Results   Component Value Date    WBC 18.4 (H) 06/01/2024    HGB 14.3 06/01/2024    HCT 43.9 06/01/2024    MCV 96 06/01/2024     06/01/2024      Lab Results   Component Value Date    GLUCOSE 128 (H) 06/01/2024    CALCIUM 9.6 06/01/2024     06/01/2024    K 3.5 06/01/2024    CO2 28 06/01/2024    CL 99 06/01/2024    BUN 20 06/01/2024    CREATININE 0.81 06/01/2024      Physical Exam  Constitutional:       General: She is not in acute distress.     Appearance: She is normal weight. She is not toxic-appearing.   HENT:      Head: Normocephalic.      Right Ear: External ear normal.      Left Ear: External ear normal.      Nose: No congestion or rhinorrhea.      Mouth/Throat:      Mouth: Mucous membranes are moist.   Eyes:      General: No scleral icterus.     Conjunctiva/sclera: clear     Pupils: Pupils are equal, round, and reactive to light.      Cardiovascular:      Rate and Rhythm: Normal rate and regular rhythm.      Pulses: Normal pulses.      Heart sounds: Normal heart sounds.   Pulmonary:      Effort: Pulmonary effort is normal. No respiratory distress.      Breath sounds: No wheezing or rales.   Abdominal:      General: Bowel sounds are normal. There is no distension.      Palpations: Abdomen is soft.      Tenderness: There is no abdominal tenderness. There is no guarding.    Musculoskeletal:      Right lower leg: No edema.      Left lower leg: nonpitting edema present.    Skin:     General: Skin is warm and dry.      Capillary Refill: Capillary refill takes less than 2 seconds.      Findings: Bruising and lesion present.      Comments: LLE with abrasion, erythema, edema. No drainage, no odor. Painful with palpation.   Neurological:      Mental Status: She is alert. Mental status is at baseline.      Comments: Oriented to self only.   Psychiatric:         Mood and Affect: Mood normal.         Behavior: Behavior normal.      Assessment/Plan   Diagnoses and all orders for this visit:  Cellulitis of left lower extremity  History of skin picking. LLE with edema, erythema and open lesions  -continue mupirocin ointment  -initiated keflex  -keep covered with clean, dry dressing when possible  Vascular dementia with anxiety, unspecified dementia severity (Multi)  Chronic, patient has been out of room on occasion. Staff feel that skin picking occurs when patient has anxiety or is bored.  -continue to encourage patient to eat in dining area and join in activities  -continue sertraline and mirtazapine (for appetite)  -initiated memantine         Erika Matta, CHANDRAKANT-CNP

## 2024-09-09 DIAGNOSIS — F01.B4 MODERATE VASCULAR DEMENTIA WITH ANXIETY (MULTI): ICD-10-CM

## 2024-09-09 RX ORDER — MEMANTINE HYDROCHLORIDE 5 MG/1
5 TABLET ORAL DAILY
Qty: 30 TABLET | Refills: 11 | Status: SHIPPED | OUTPATIENT
Start: 2024-09-09 | End: 2024-09-14 | Stop reason: SDUPTHER

## 2024-09-11 VITALS
HEART RATE: 78 BPM | TEMPERATURE: 97.3 F | SYSTOLIC BLOOD PRESSURE: 134 MMHG | RESPIRATION RATE: 15 BRPM | OXYGEN SATURATION: 98 % | DIASTOLIC BLOOD PRESSURE: 69 MMHG

## 2024-09-14 DIAGNOSIS — F01.B4 MODERATE VASCULAR DEMENTIA WITH ANXIETY: ICD-10-CM

## 2024-09-14 DIAGNOSIS — L03.116 CELLULITIS OF LEFT LOWER EXTREMITY: Primary | ICD-10-CM

## 2024-09-14 RX ORDER — MEMANTINE HYDROCHLORIDE 10 MG/1
10 TABLET ORAL DAILY
Qty: 30 TABLET | Refills: 1 | Status: SHIPPED | OUTPATIENT
Start: 2024-09-14

## 2024-09-14 RX ORDER — CIPROFLOXACIN 500 MG/1
500 TABLET ORAL 2 TIMES DAILY
Qty: 14 TABLET | Refills: 0 | Status: SHIPPED | OUTPATIENT
Start: 2024-09-14 | End: 2024-09-21

## 2024-09-14 RX ORDER — SERTRALINE HYDROCHLORIDE 100 MG/1
100 TABLET, FILM COATED ORAL DAILY
Qty: 30 TABLET | Refills: 1 | Status: SHIPPED | OUTPATIENT
Start: 2024-09-14